# Patient Record
Sex: MALE | Race: OTHER | URBAN - METROPOLITAN AREA
[De-identification: names, ages, dates, MRNs, and addresses within clinical notes are randomized per-mention and may not be internally consistent; named-entity substitution may affect disease eponyms.]

---

## 2021-11-23 ENCOUNTER — OFFICE VISIT (OUTPATIENT)
Dept: OTOLARYNGOLOGY | Facility: CLINIC | Age: 81
End: 2021-11-23
Payer: MEDICARE

## 2021-11-23 VITALS — HEIGHT: 63 IN | WEIGHT: 173 LBS | BODY MASS INDEX: 30.65 KG/M2

## 2021-11-23 DIAGNOSIS — H72.92 PERFORATION OF LEFT TYMPANIC MEMBRANE: ICD-10-CM

## 2021-11-23 DIAGNOSIS — H92.21 OTORRHAGIA OF RIGHT EAR: Primary | ICD-10-CM

## 2021-11-23 PROCEDURE — 99203 OFFICE O/P NEW LOW 30 MIN: CPT | Performed by: OTOLARYNGOLOGY

## 2021-11-23 PROCEDURE — 3008F BODY MASS INDEX DOCD: CPT | Performed by: OTOLARYNGOLOGY

## 2021-11-23 RX ORDER — FERROUS SULFATE 325(65) MG
TABLET ORAL
COMMUNITY

## 2021-11-23 RX ORDER — POTASSIUM CHLORIDE 750 MG/1
TABLET, FILM COATED, EXTENDED RELEASE ORAL
COMMUNITY

## 2021-11-23 RX ORDER — AMIODARONE HYDROCHLORIDE 200 MG/1
100 TABLET ORAL DAILY
COMMUNITY
Start: 2021-09-07

## 2021-11-23 RX ORDER — AMLODIPINE BESYLATE 10 MG/1
10 TABLET ORAL DAILY
COMMUNITY
Start: 2021-09-07

## 2021-11-23 RX ORDER — BUMETANIDE 1 MG/1
1 TABLET ORAL DAILY
COMMUNITY
Start: 2021-10-06

## 2021-11-23 RX ORDER — ACETAMINOPHEN AND CODEINE PHOSPHATE 300; 30 MG/1; MG/1
TABLET ORAL
COMMUNITY

## 2021-11-23 RX ORDER — APIXABAN 2.5 MG/1
2.5 TABLET, FILM COATED ORAL 2 TIMES DAILY
COMMUNITY
Start: 2021-09-07

## 2021-11-23 RX ORDER — ALBUTEROL SULFATE 90 UG/1
AEROSOL, METERED RESPIRATORY (INHALATION)
COMMUNITY
Start: 2021-09-14

## 2021-11-23 RX ORDER — PANTOPRAZOLE SODIUM 40 MG/1
1 TABLET, DELAYED RELEASE ORAL DAILY
COMMUNITY
Start: 2019-10-04

## 2021-11-23 RX ORDER — AZITHROMYCIN 250 MG/1
TABLET, FILM COATED ORAL
COMMUNITY
Start: 2021-09-16

## 2021-11-23 RX ORDER — OFLOXACIN 3 MG/ML
3 SOLUTION AURICULAR (OTIC) DAILY
Qty: 10 ML | Refills: 0 | Status: SHIPPED | OUTPATIENT
Start: 2021-11-23

## 2021-11-23 RX ORDER — POMALIDOMIDE 4 MG/1
CAPSULE ORAL
COMMUNITY
Start: 2019-10-03

## 2021-11-23 RX ORDER — ATORVASTATIN CALCIUM 20 MG/1
20 TABLET, FILM COATED ORAL NIGHTLY
COMMUNITY
Start: 2021-10-06

## 2021-11-24 NOTE — PROGRESS NOTES
Briana Christensen is a 78year old male. Patient presents with:  Ear Problem: Pt has blood inside of ears for about 4 days now , was ref by pcp      HISTORY OF PRESENT ILLNESS  He presents with a history of bleeding from his right ear about 4 days ago.   Seen b Palpation - Normal. Parotid gland - Normal. Thyroid gland - Normal.   Eyes Normal Conjunctiva - Right: Normal, Left: Normal. Pupil - Right: Normal, Left: Normal. Fundus - Right: Normal, Left: Normal.   Neurological Normal Memory - Normal. Cranial nerves - Oral Tab EC, Take 1 tablet by mouth daily. , Disp: , Rfl:   •  Pomalidomide (POMALYST) 4 MG Oral Cap, Pomalyst 4 mg capsule  TAKE ONE CAPSULE BY MOUTH EVERY DAY FOR 21 DAYS, THEN TAKE 7 DAYS OFF., Disp: , Rfl:   •  Potassium Chloride ER 10 MEQ Oral Tab CR,

## 2021-12-07 ENCOUNTER — OFFICE VISIT (OUTPATIENT)
Dept: OTOLARYNGOLOGY | Facility: CLINIC | Age: 81
End: 2021-12-07
Payer: MEDICARE

## 2021-12-07 VITALS — HEIGHT: 63 IN | BODY MASS INDEX: 30.65 KG/M2 | WEIGHT: 173 LBS

## 2021-12-07 DIAGNOSIS — H92.21 OTORRHAGIA OF RIGHT EAR: Primary | ICD-10-CM

## 2021-12-07 DIAGNOSIS — H72.92 PERFORATION OF LEFT TYMPANIC MEMBRANE: ICD-10-CM

## 2021-12-07 PROCEDURE — 99213 OFFICE O/P EST LOW 20 MIN: CPT | Performed by: OTOLARYNGOLOGY

## 2021-12-07 PROCEDURE — 3008F BODY MASS INDEX DOCD: CPT | Performed by: OTOLARYNGOLOGY

## 2021-12-07 RX ORDER — FLUTICASONE PROPIONATE 50 MCG
1 SPRAY, SUSPENSION (ML) NASAL 2 TIMES DAILY
Qty: 16 G | Refills: 3 | Status: SHIPPED | OUTPATIENT
Start: 2021-12-07

## 2021-12-07 RX ORDER — LORATADINE 10 MG/1
10 TABLET ORAL DAILY
Qty: 30 TABLET | Refills: 3 | Status: SHIPPED | OUTPATIENT
Start: 2021-12-07

## 2021-12-07 RX ORDER — MONTELUKAST SODIUM 10 MG/1
10 TABLET ORAL NIGHTLY
Qty: 30 TABLET | Refills: 3 | Status: SHIPPED | OUTPATIENT
Start: 2021-12-07

## 2021-12-07 NOTE — PROGRESS NOTES
Yareli Hager is a 78year old male. Patient presents with: Follow - Up: Pt is here for 3 week f/u on dry blood that was inside of ears .  Pt says he feels better after starting ofloxacin   Cough: Pt says hes been having a bad cough for a while and has temo Frequent skin infections, pigment change and rash. Hema/Lymph Negative Easy bleeding and easy bruising.            PHYSICAL EXAM    Ht 5' 3\" (1.6 m)   Wt 173 lb (78.5 kg)   BMI 30.65 kg/m²        Constitutional Normal Overall appearance - Normal.   Psych mg by mouth nightly., Disp: , Rfl:   •  azithromycin 250 MG Oral Tab, , Disp: , Rfl:   •  bumetanide 1 MG Oral Tab, Take 1 mg by mouth daily. , Disp: , Rfl:   •  Ferrous Sulfate 325 (65 Fe) MG Oral Tab, FeroSul 325 mg (65 mg iron) tablet  TAKE 1 TABLET BY M

## 2024-08-26 ENCOUNTER — OFFICE VISIT (OUTPATIENT)
Dept: OTOLARYNGOLOGY | Facility: CLINIC | Age: 84
End: 2024-08-26

## 2024-08-26 ENCOUNTER — OFFICE VISIT (OUTPATIENT)
Dept: AUDIOLOGY | Facility: CLINIC | Age: 84
End: 2024-08-26

## 2024-08-26 VITALS — WEIGHT: 173 LBS | BODY MASS INDEX: 30.65 KG/M2 | HEIGHT: 63 IN

## 2024-08-26 DIAGNOSIS — H90.A32 MIXED CONDUCTIVE AND SENSORINEURAL HEARING LOSS OF LEFT EAR WITH RESTRICTED HEARING OF RIGHT EAR: Primary | ICD-10-CM

## 2024-08-26 DIAGNOSIS — H91.13 PRESBYCUSIS, BILATERAL: ICD-10-CM

## 2024-08-26 DIAGNOSIS — H69.93 DYSFUNCTION OF BOTH EUSTACHIAN TUBES: ICD-10-CM

## 2024-08-26 DIAGNOSIS — H90.A21 SENSORINEURAL HEARING LOSS (SNHL) OF RIGHT EAR WITH RESTRICTED HEARING OF LEFT EAR: ICD-10-CM

## 2024-08-26 DIAGNOSIS — H91.90 HEARING LOSS, UNSPECIFIED HEARING LOSS TYPE, UNSPECIFIED LATERALITY: Primary | ICD-10-CM

## 2024-08-26 DIAGNOSIS — H90.A32 MIXED CONDUCTIVE AND SENSORINEURAL HEARING LOSS OF LEFT EAR WITH RESTRICTED HEARING OF RIGHT EAR: ICD-10-CM

## 2024-08-26 PROCEDURE — 92557 COMPREHENSIVE HEARING TEST: CPT | Performed by: AUDIOLOGIST

## 2024-08-26 PROCEDURE — 92567 TYMPANOMETRY: CPT | Performed by: AUDIOLOGIST

## 2024-08-26 RX ORDER — LISINOPRIL 5 MG/1
5 TABLET ORAL DAILY
COMMUNITY

## 2024-08-26 RX ORDER — DEXAMETHASONE 4 MG/1
TABLET ORAL
COMMUNITY
Start: 2023-06-14

## 2024-08-27 NOTE — PATIENT INSTRUCTIONS
You would benefit from binaural hearing aids.  Your left ear does have a mixed hearing loss however no perforation.  We are going to check with your insurance to see what your benefits are so that it hearing aids can be replaced.

## 2024-08-27 NOTE — PROGRESS NOTES
Jose Bauer is a 84 year old male.   Chief Complaint   Patient presents with    Hearing Loss     Hearing loss    Ear Problem     Ear check     HPI:   Patient here had surgery on his left ear several years ago.  Wears binaural hearing aids.  They are currently not functioning.  They are about 4 years old.    Current Outpatient Medications   Medication Sig Dispense Refill    ascorbic acid 1000 MG Oral Tab Take 1 tablet (1,000 mg total) by mouth daily.      dexamethasone (DECADRON) 4 MG tablet Take 3 tablets every week by oral route.      lisinopril 5 MG Oral Tab Take 1 tablet (5 mg total) by mouth daily.      montelukast 10 MG Oral Tab Take 1 tablet (10 mg total) by mouth nightly. 30 tablet 3    loratadine 10 MG Oral Tab Take 1 tablet (10 mg total) by mouth daily. 30 tablet 3    fluticasone propionate 50 MCG/ACT Nasal Suspension 1 spray by Nasal route 2 (two) times daily. 16 g 3    Acetaminophen-Codeine 300-30 MG Oral Tab acetaminophen 300 mg-codeine 30 mg tablet   TAKE 1 TO 2 TABLETS BY MOUTH EVERY 8 HOURS      albuterol 108 (90 Base) MCG/ACT Inhalation Aero Soln       amiodarone 200 MG Oral Tab Take 0.5 tablets (100 mg total) by mouth daily.      amLODIPine 10 MG Oral Tab Take 1 tablet (10 mg total) by mouth daily.      ELIQUIS 2.5 MG Oral Tab Take 1 tablet (2.5 mg total) by mouth 2 (two) times daily.      atorvastatin 20 MG Oral Tab Take 1 tablet (20 mg total) by mouth nightly.      bumetanide 1 MG Oral Tab Take 1 tablet (1 mg total) by mouth daily.      Ferrous Sulfate 325 (65 Fe) MG Oral Tab FeroSul 325 mg (65 mg iron) tablet   TAKE 1 TABLET BY MOUTH EVERY MORNING      pantoprazole 40 MG Oral Tab EC Take 1 tablet (40 mg total) by mouth daily.      Pomalidomide (POMALYST) 4 MG Oral Cap Pomalyst 4 mg capsule   TAKE ONE CAPSULE BY MOUTH EVERY DAY FOR 21 DAYS, THEN TAKE 7 DAYS OFF.      Potassium Chloride ER 10 MEQ Oral Tab CR potassium chloride ER 10 mEq tablet,extended release   TAKE 1 TABLET BY MOUTH EVERY  MORNING      ofloxacin 0.3 % Otic Solution Place 3 drops into the right ear daily. 10 mL 0    azithromycin 250 MG Oral Tab  (Patient not taking: Reported on 8/26/2024)        History reviewed. No pertinent past medical history.   Social History:  Social History     Socioeconomic History    Marital status:    Tobacco Use    Smoking status: Never    Smokeless tobacco: Never   Substance and Sexual Activity    Alcohol use: Not Currently     Social Determinants of Health     Financial Resource Strain: Low Risk  (8/14/2023)    Received from [facility]    Overall Financial Resource Strain (CARDIA)     Difficulty of Paying Living Expenses: Not hard at all   Food Insecurity: No Food Insecurity (8/14/2023)    Received from [facility]    Hunger Vital Sign     Worried About Running Out of Food in the Last Year: Never true     Ran Out of Food in the Last Year: Never true   Transportation Needs: No Transportation Needs (8/14/2023)    Received from [facility]    PRAPARE - Transportation     Lack of Transportation (Medical): No     Lack of Transportation (Non-Medical): No   Housing Stability: Low Risk  (8/14/2023)    Received from [facility]    Housing Stability Vital Sign     Unable to Pay for Housing in the Last Year: No     Number of Places Lived in the Last Year: 1     In the last 12 months, was there a time when you did not have a steady place to sleep or slept in a shelter (including now)?: No        REVIEW OF SYSTEMS:   GENERAL HEALTH: feels well otherwise  GENERAL : denies fever, chills, sweats, weight loss, weight gain  SKIN: denies any unusual skin lesions or rashes  RESPIRATORY: denies shortness of breath with exertion  NEURO: denies headaches    EXAM:   Ht 5' 3\" (1.6 m)   Wt 173 lb (78.5 kg)   BMI 30.65 kg/m²   System Details   Skin Inspection - Normal.   Constitutional Overall appearance - Normal.   Head/Face Facial  features - Normal. Eyebrows - Normal. Skull - Normal.   Eyes Conjunctiva - Right: Normal, Left: Normal. Pupil - Right: Normal, Left: Normal.    Ears Inspection - Right: Normal, Left: Normal.   Canal -bilateral nonocclusive cerumen fully cleaned  TM - Right: Normal, Left: Thickened left tympanic membrane but intact   Nasal External nose - Normal.   Nasal septum - Normal.  Turbinates - Normal.   Oral/Oropharynx Lips - Normal, Tonsils - Normal, Tongue - Normal    Neck Exam Inspection - Normal. Palpation - Normal. Parotid gland - Normal. Thyroid gland - Normal.   Lymph Detail Submental. Submandibular. Anterior cervical. Posterior cervical. Supraclavicular all without enlargement   Psychiatric Orientation - Oriented to time, place, person & situation. Appropriate mood and affect.   Neurological Memory - Normal. Cranial nerves - Cranial nerves II through XII grossly intact.     ASSESSMENT AND PLAN:   1. Hearing loss, unspecified hearing loss type, unspecified laterality  Audiogram reviewed with patient at the date of the visit.  This shows bilateral presbycusis and a mixed left hearing loss.  No perforation.  Exam reviewed with patient and wife at the time of the visit.  - Audiology Referral - [facility]    2. Presbycusis, bilateral  Patient would benefit from a replacement of his binaural hearing aids.  We are going to check benefits to see what his coverage options are.    3. Mixed conductive and sensorineural hearing loss of left ear with restricted hearing of right ear  No definite middle ear fluid seen.  Suspect secondary to a thickened tympanic membrane.  Tympanic membrane is however intact.      The patient indicates understanding of these issues and agrees to the plan.      Jeanie Gomes MD  8/26/2024  9:40 PM

## 2024-09-20 ENCOUNTER — TELEPHONE (OUTPATIENT)
Dept: OTOLARYNGOLOGY | Facility: CLINIC | Age: 84
End: 2024-09-20

## 2024-09-20 NOTE — TELEPHONE ENCOUNTER
Called   No answer, voicemail not set up     Verified coverage, Called [employer] at [phone] spoke with Tana MOISE call reference number is [id number]     Call Duration: 9 minutes     Patient has hearing aid benefits but must go through [employer] at [phone].

## 2024-09-23 NOTE — TELEPHONE ENCOUNTER
Left detail voice message to pt daughter Sendy      Verified coverage, Called [employer] at [phone] spoke with Tana MOISE call reference number is [id number]     Call Duration: 9 minutes     Patient has hearing aid benefits but must go through [employer] at [phone].

## 2024-10-10 ENCOUNTER — TELEPHONE (OUTPATIENT)
Dept: AUDIOLOGY | Facility: CLINIC | Age: 84
End: 2024-10-10

## 2024-10-10 NOTE — TELEPHONE ENCOUNTER
Patient is German-speaking.   Can one of you call him?   If he just wants copy of hearing test we can either mail it to him or have him come in to office to pick it up.   (Doesn't look like he's on [employer]).   Thanks!

## 2024-10-10 NOTE — TELEPHONE ENCOUNTER
Per Angelica from [facility] calling stating patient stated he has not received he's hearing test results.Please advise    Call patient please   [phone]

## 2025-04-10 ENCOUNTER — OFFICE VISIT (OUTPATIENT)
Dept: OTOLARYNGOLOGY | Facility: CLINIC | Age: 85
End: 2025-04-10

## 2025-04-10 DIAGNOSIS — H91.90 HEARING LOSS, UNSPECIFIED HEARING LOSS TYPE, UNSPECIFIED LATERALITY: Primary | ICD-10-CM

## 2025-04-10 DIAGNOSIS — H61.23 BILATERAL IMPACTED CERUMEN: ICD-10-CM

## 2025-04-10 PROCEDURE — 69210 REMOVE IMPACTED EAR WAX UNI: CPT | Performed by: OTOLARYNGOLOGY

## 2025-04-10 PROCEDURE — 1159F MED LIST DOCD IN RCRD: CPT | Performed by: OTOLARYNGOLOGY

## 2025-04-10 PROCEDURE — 1160F RVW MEDS BY RX/DR IN RCRD: CPT | Performed by: OTOLARYNGOLOGY

## 2025-04-10 NOTE — PROGRESS NOTES
Jose Bauer is a 84 year old male.    Chief Complaint   Patient presents with    Follow - Up     Patient is here for 2 weeks difficult to hear from the right ear        HISTORY OF PRESENT ILLNESS    Patient presents for cerumen removal. No other complaints or concerns at this time    Social Hx on file[1]    Family History[2]    Past Medical History[3]    Past Surgical History[4]    REVIEW OF SYSTEMS    System Neg/Pos Details   Constitutional Negative Fatigue, fever and weight loss.   ENMT Negative Drooling.   Eyes Negative Blurred vision and vision changes.   Respiratory Negative Dyspnea and wheezing.   Cardio Negative Chest pain, irregular heartbeat/palpitations and syncope.   GI Negative Abdominal pain and diarrhea.   Endocrine Negative Cold intolerance and heat intolerance.   Neuro Negative Tremors.   Psych Negative Anxiety and depression.   Integumentary Negative Frequent skin infections, pigment change and rash.   Hema/Lymph Negative Easy bleeding and easy bruising.           PHYSICAL EXAM    There were no vitals taken for this visit.       Constitutional Normal Overall appearance - Normal.        Neck Exam Normal Inspection - Normal. Palpation - Normal. Parotid gland - Normal. Thyroid gland - Normal.             Head/Face Normal Facial features - Normal. Eyebrows - Normal. Skull - Normal.             Ears Normal Inspection - Right: Normal, Left: Normal. Canal - Right: Normal, Left: Normal. TM - Right: Normal, Left: Normal.   Skin Normal Inspection - Normal.                              Canals:  Right: Canal reveals cerumen impaction,   Left: Canal reveals cerumen impaction,     Tympanic Membranes:  Right: Normal tympanic membrane.   Left: Normal tympanic membrane.     TM Visualized Method:   Right TM examined via otomicroscopy.    Left TM examined via otomicroscopy.      PROCEDURE:    Removal of cerumen impaction   The cerumen impaction was completely removed using microscopy.   Removal was completed by  using acurette and/or suction.   Comments: Return to clinic as needed.  Avoid q-tips, water precautions and use over the counter wax remedies as needed.    Medications - Current[5]  ASSESSMENT AND PLAN    1. Hearing loss, unspecified hearing loss type, unspecified laterality    - Audiology Referral - [facility])    2. Bilateral impacted cerumen    - REMOVAL IMPACTED CERUMEN REQUIRING INSTRUMENTATION, UNILATERAL      All cerumen was removed using microscopy. I have asked the patient to return to see me as needed for repeat cerumen removal in the future.      Anderson Berger MD    4/10/2025    10:00 AM           [1]   Social History  Socioeconomic History    Marital status:    Tobacco Use    Smoking status: Never    Smokeless tobacco: Never   Substance and Sexual Activity    Alcohol use: Not Currently   [2] History reviewed. No pertinent family history.  [3] History reviewed. No pertinent past medical history.  [4] History reviewed. No pertinent surgical history.  [5]   Current Outpatient Medications:     ascorbic acid 1000 MG Oral Tab, Take 1 tablet (1,000 mg total) by mouth daily., Disp: , Rfl:     dexamethasone (DECADRON) 4 MG tablet, Take 3 tablets every week by oral route., Disp: , Rfl:     lisinopril 5 MG Oral Tab, Take 1 tablet (5 mg total) by mouth daily., Disp: , Rfl:     montelukast 10 MG Oral Tab, Take 1 tablet (10 mg total) by mouth nightly., Disp: 30 tablet, Rfl: 3    loratadine 10 MG Oral Tab, Take 1 tablet (10 mg total) by mouth daily., Disp: 30 tablet, Rfl: 3    fluticasone propionate 50 MCG/ACT Nasal Suspension, 1 spray by Nasal route 2 (two) times daily., Disp: 16 g, Rfl: 3    Acetaminophen-Codeine 300-30 MG Oral Tab, acetaminophen 300 mg-codeine 30 mg tablet  TAKE 1 TO 2 TABLETS BY MOUTH EVERY 8 HOURS, Disp: , Rfl:     albuterol 108 (90 Base) MCG/ACT Inhalation Aero Soln, , Disp: , Rfl:     amiodarone 200 MG Oral Tab, Take 0.5 tablets (100 mg total) by mouth daily., Disp: ,  Rfl:     amLODIPine 10 MG Oral Tab, Take 1 tablet (10 mg total) by mouth daily., Disp: , Rfl:     ELIQUIS 2.5 MG Oral Tab, Take 1 tablet (2.5 mg total) by mouth 2 (two) times daily., Disp: , Rfl:     atorvastatin 20 MG Oral Tab, Take 1 tablet (20 mg total) by mouth nightly., Disp: , Rfl:     bumetanide 1 MG Oral Tab, Take 1 tablet (1 mg total) by mouth daily., Disp: , Rfl:     Ferrous Sulfate 325 (65 Fe) MG Oral Tab, FeroSul 325 mg (65 mg iron) tablet  TAKE 1 TABLET BY MOUTH EVERY MORNING, Disp: , Rfl:     pantoprazole 40 MG Oral Tab EC, Take 1 tablet (40 mg total) by mouth daily., Disp: , Rfl:     Pomalidomide (POMALYST) 4 MG Oral Cap, Pomalyst 4 mg capsule  TAKE ONE CAPSULE BY MOUTH EVERY DAY FOR 21 DAYS, THEN TAKE 7 DAYS OFF., Disp: , Rfl:     Potassium Chloride ER 10 MEQ Oral Tab CR, potassium chloride ER 10 mEq tablet,extended release  TAKE 1 TABLET BY MOUTH EVERY MORNING, Disp: , Rfl:     ofloxacin 0.3 % Otic Solution, Place 3 drops into the right ear daily., Disp: 10 mL, Rfl: 0    azithromycin 250 MG Oral Tab, , Disp: , Rfl: